# Patient Record
Sex: MALE | Race: WHITE | NOT HISPANIC OR LATINO | Employment: UNEMPLOYED | URBAN - METROPOLITAN AREA
[De-identification: names, ages, dates, MRNs, and addresses within clinical notes are randomized per-mention and may not be internally consistent; named-entity substitution may affect disease eponyms.]

---

## 2024-11-29 ENCOUNTER — OFFICE VISIT (OUTPATIENT)
Dept: URGENT CARE | Facility: CLINIC | Age: 34
End: 2024-11-29
Payer: MEDICAID

## 2024-11-29 VITALS
HEART RATE: 73 BPM | WEIGHT: 262.38 LBS | DIASTOLIC BLOOD PRESSURE: 78 MMHG | RESPIRATION RATE: 20 BRPM | TEMPERATURE: 98 F | HEIGHT: 66 IN | SYSTOLIC BLOOD PRESSURE: 125 MMHG | BODY MASS INDEX: 42.17 KG/M2 | OXYGEN SATURATION: 99 %

## 2024-11-29 DIAGNOSIS — S91.332A PUNCTURE WOUND OF LEFT FOOT, INITIAL ENCOUNTER: Primary | ICD-10-CM

## 2024-11-29 RX ORDER — CIPROFLOXACIN 500 MG/1
500 TABLET ORAL EVERY 12 HOURS
Qty: 10 TABLET | Refills: 0 | Status: SHIPPED | OUTPATIENT
Start: 2024-11-29 | End: 2024-12-04

## 2024-11-29 RX ORDER — SULFAMETHOXAZOLE AND TRIMETHOPRIM 800; 160 MG/1; MG/1
1 TABLET ORAL 2 TIMES DAILY
Qty: 10 TABLET | Refills: 0 | Status: SHIPPED | OUTPATIENT
Start: 2024-11-29 | End: 2024-12-04

## 2024-11-29 NOTE — PATIENT INSTRUCTIONS
You must understand that you have received treatment at an Urgent Care facility only, and that you may be  released before all of your medical problems are known or treated. Urgent Care facilities are not equipped to  handle life threatening emergencies. It is recommended that you seek care at an Emergency Department for  further evaluation of worsening or concerning symptoms, or possibly life threatening conditions as  discussed.    Clean area with Dial soap and water twice a day. Elevate foot. Take antibiotics with food. If you begin to have increase redness, discharge to area, fever, increase pain/swelling, or worsening symptoms then go to nearest Emergency Room.     Patient Instructions   1.  Take all medications as directed. If you have been prescribed antibiotics, make sure to complete them.   2.  Rest and keep yourself/patient well hydrated. For adults, it is recommended to drink at least 8-10 glasses of water daily.   3.  For patients above 6 months of age who are not allergic to and are not on anticoagulants, you can alternate Tylenol and Motrin every 4-6 hours for fever above 100.4F and/or pain.  For patients less than 6 months of age, allergic to or intolerant to NSAIDS, have gastritis, gastric ulcers, or history of GI bleeds, are pregnant, or are on anticoagulant therapy, you can take Tylenol every 4 hours as needed for fever above 100.4F and/or pain.   4. You should schedule a follow-up appointment with your Primary Care Provider/Pediatrician for recheck in 2-3 days or as directed at this visit.   5.  If your condition fails to improve in a timely manner, you should receive another evaluation by your Primary Care Provider/Pediatrician to discuss your concerns or return to urgent care for a recheck.  If your condition worsens at any time, you should report immediately to your nearest Emergency Department for further evaluation. **You must understand that you have received Urgent Care treatment only and  that you may be released before all of your medical problems are known or treated. You, the patient, are responsible to arrange for follow-up care as instructed.

## 2024-11-29 NOTE — PROGRESS NOTES
"Subjective:      Patient ID: Johnathon Villanueva is a 34 y.o. male.    Vitals:  height is 5' 6" (1.676 m) and weight is 119 kg (262 lb 5.6 oz). His oral temperature is 98 °F (36.7 °C). His blood pressure is 125/78 and his pulse is 73. His respiration is 20 and oxygen saturation is 99%.     Chief Complaint: Toe Injury    Patient states 2 days ago he stepped on a diaper pen in the yard. Stated it punctured the bottom of his foot about an inch. Pt reports pin went through his crocs. Denies fever, drainage from area. Reports soaking in Epsom salt. Pt reports removing entire pin.     Toe Injury  This is a new problem. Episode onset: 2 days ago. The problem occurs constantly. The problem has been gradually worsening. Associated symptoms include joint swelling. Pertinent negatives include no numbness. The symptoms are aggravated by walking and standing. Treatments tried: antibiotic ointment. The treatment provided no relief.       Musculoskeletal:  Positive for joint swelling.   Neurological:  Negative for numbness.      Objective:     Physical Exam   Constitutional:  Non-toxic appearance. He does not appear ill. No distress.   HENT:   Head: Normocephalic and atraumatic.   Ears:   Right Ear: External ear normal.   Left Ear: External ear normal.   Nose: Nose normal.   Mouth/Throat: Mucous membranes are moist.   Eyes: Conjunctivae are normal.   Neck: No neck rigidity present.   Pulmonary/Chest: Effort normal. No respiratory distress.   Abdominal: Normal appearance.   Musculoskeletal:        Feet:       Comments: Left foot has small puncture wound to medial plantar aspect of foot near 1st digit. No discharge, drainage, fluctulance to area. Area is TTP. No swelling noted. NVI. Limited AROM of first digit. No noted foreign body felt or seen.    Neurological: He is alert.   Skin: Skin is warm, dry and not diaphoretic. Capillary refill takes less than 2 seconds.   Psychiatric: His behavior is normal. Judgment and thought content " normal.   Nursing note and vitals reviewed.      Assessment:     1. Puncture wound of left foot, initial encounter        Plan:       Puncture wound of left foot, initial encounter  -     Tdap (BOOSTRIX) vaccine injection 0.5 mL  -     sulfamethoxazole-trimethoprim 800-160mg (BACTRIM DS) 800-160 mg Tab; Take 1 tablet by mouth 2 (two) times daily. for 5 days  Dispense: 10 tablet; Refill: 0  -     ciprofloxacin HCl (CIPRO) 500 MG tablet; Take 1 tablet (500 mg total) by mouth every 12 (twelve) hours. for 5 days  Dispense: 10 tablet; Refill: 0      Tdap UTD in clinic.   Pt declined x-ray imaging today to rule out foreign body. Reports he removed entire pin. No foreign body noted on examination.       No sign of infection noted on examination; however, since wound occurred through sole of shoe and involved dirt exposure will begin oral abx.     Advised to clean area with Dial soap and water. Rest and elevate.   Discussed with patient the importance of f/u with their primary care provider. Urged to go to the ER for any worsening signs or symptoms.     Patient Instructions   1.  Take all medications as directed. If you have been prescribed antibiotics, make sure to complete them.   2.  Rest and keep yourself/patient well hydrated. For adults, it is recommended to drink at least 8-10 glasses of water daily.   3.  For patients above 6 months of age who are not allergic to and are not on anticoagulants, you can alternate Tylenol and Motrin every 4-6 hours for fever above 100.4F and/or pain.  For patients less than 6 months of age, allergic to or intolerant to NSAIDS, have gastritis, gastric ulcers, or history of GI bleeds, are pregnant, or are on anticoagulant therapy, you can take Tylenol every 4 hours as needed for fever above 100.4F and/or pain.   4. You should schedule a follow-up appointment with your Primary Care Provider/Pediatrician for recheck in 2-3 days or as directed at this visit.   5.  If your condition fails to  improve in a timely manner, you should receive another evaluation by your Primary Care Provider/Pediatrician to discuss your concerns or return to urgent care for a recheck.  If your condition worsens at any time, you should report immediately to your nearest Emergency Department for further evaluation. **You must understand that you have received Urgent Care treatment only and that you may be released before all of your medical problems are known or treated. You, the patient, are responsible to arrange for follow-up care as instructed.

## 2025-03-06 ENCOUNTER — OFFICE VISIT (OUTPATIENT)
Dept: URGENT CARE | Facility: CLINIC | Age: 35
End: 2025-03-06
Payer: MEDICAID

## 2025-03-06 VITALS
HEART RATE: 103 BPM | SYSTOLIC BLOOD PRESSURE: 159 MMHG | HEIGHT: 66 IN | TEMPERATURE: 99 F | OXYGEN SATURATION: 97 % | DIASTOLIC BLOOD PRESSURE: 99 MMHG | BODY MASS INDEX: 42.71 KG/M2 | RESPIRATION RATE: 19 BRPM | WEIGHT: 265.75 LBS

## 2025-03-06 DIAGNOSIS — S61.411A LACERATION OF RIGHT HAND WITHOUT FOREIGN BODY, INITIAL ENCOUNTER: ICD-10-CM
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
## 2025-03-06 DIAGNOSIS — S61.512A LACERATION OF LEFT WRIST, INITIAL ENCOUNTER: ICD-10-CM

## 2025-03-06 RX ORDER — MUPIROCIN 20 MG/G
OINTMENT TOPICAL 3 TIMES DAILY
Qty: 22 G | Refills: 0 | Status: SHIPPED | OUTPATIENT
Start: 2025-03-06

## 2025-03-06 RX ORDER — AMOXICILLIN AND CLAVULANATE POTASSIUM 875; 125 MG/1; MG/1
1 TABLET, FILM COATED ORAL EVERY 12 HOURS
Qty: 20 TABLET | Refills: 0 | Status: SHIPPED | OUTPATIENT
Start: 2025-03-06 | End: 2025-03-06

## 2025-03-06 RX ORDER — MUPIROCIN 20 MG/G
OINTMENT TOPICAL 3 TIMES DAILY
Qty: 22 G | Refills: 0 | Status: SHIPPED | OUTPATIENT
Start: 2025-03-06 | End: 2025-03-06

## 2025-03-06 RX ORDER — HYDROCODONE BITARTRATE AND ACETAMINOPHEN 5; 325 MG/1; MG/1
1 TABLET ORAL EVERY 6 HOURS PRN
Qty: 20 TABLET | Refills: 0 | Status: SHIPPED | OUTPATIENT
Start: 2025-03-06 | End: 2025-03-11

## 2025-03-06 RX ORDER — AMOXICILLIN AND CLAVULANATE POTASSIUM 875; 125 MG/1; MG/1
1 TABLET, FILM COATED ORAL EVERY 12 HOURS
Qty: 20 TABLET | Refills: 0 | Status: SHIPPED | OUTPATIENT
Start: 2025-03-06 | End: 2025-03-16
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
## 2025-03-07 ENCOUNTER — TELEPHONE (OUTPATIENT)
Dept: URGENT CARE | Facility: CLINIC | Age: 35
End: 2025-03-07
Payer: MEDICAID

## 2025-03-07 NOTE — TELEPHONE ENCOUNTER
Called pharmacy to confirm they received the pain medication sent over.  Pharmacy stated they did receive it, but can't fill it until the patient brings in his ID.

## 2025-03-07 NOTE — PROCEDURES
"Laceration Repair    Date/Time: 3/6/2025 5:30 PM    Performed by: Pepper Motley PA-C  Authorized by: Pepper Motley PA-C  Consent Done: Yes  Consent: Verbal consent obtained  Risks and benefits: risks, benefits and alternatives were discussed  Consent given by: patient  Patient understanding: patient states understanding of the procedure being performed  Patient consent: the patient's understanding of the procedure matches consent given  Procedure consent: procedure consent matches procedure scheduled  Imaging studies: imaging studies available  Patient identity confirmed:  and name  Time out: Immediately prior to procedure a "time out" was called to verify the correct patient, procedure, equipment, support staff and site/side marked as required.  Body area: upper extremity  Location details: right hand  Laceration length: 4.5 (4.5 cm lac dorsum, 4 cm lac palmar, 2cm thenar) cm  Foreign bodies: no foreign bodies  Nerve involvement: none  Vascular damage: no  Anesthesia: local infiltration    Anesthesia:  Local Anesthetic: lidocaine 1% without epinephrine  Anesthetic total: 4.5 (1ml dorsum, 2.5ml palmar, 1ml thenar) mL    Patient sedated: no  Preparation: Patient was prepped and draped in the usual sterile fashion.  Irrigation solution: saline  Irrigation method: syringe  Amount of cleaning: extensive  Debridement: none  Degree of undermining: none  Skin closure: 5-0 Prolene (3 5-0 prolene dorsum, 3 5-0 prolene palmar, 1 5-0 thenar)  Number of sutures: 7  Technique: simple  Approximation: loose  Approximation difficulty: simple  Dressing: antibiotic ointment, dressing applied, non-stick sterile dressing and gauze roll  Patient tolerance: Patient tolerated the procedure well with no immediate complications        "

## 2025-03-07 NOTE — TELEPHONE ENCOUNTER
Called to f/u with pt. Started taking antibiotics last night. States he will call and schedule appointment with Ortho today.  [facility]  number provided if pt has trouble scheduling with his established Orthopedic

## 2025-03-07 NOTE — PATIENT INSTRUCTIONS
You must understand that you have received treatment at an Urgent Care facility only, and that you may be  released before all of your medical problems are known or treated. Urgent Care facilities are not equipped to  handle life threatening emergencies. It is recommended that you seek care at an Emergency Department for  further evaluation of worsening or concerning symptoms, or possibly life threatening conditions as  discussed.    Do not submerge hand. Keep area clean and dry. Take all antibiotics as prescribed. Follow up with Dr. Noble Palomo MD at [facility] in 1-2 days. A referral has been placed for you.     If you were given Narcotic medication, do not drive or operate machinery.   WOUND CARE/ABRASIONS    Wash the wound(s) gently daily with warm water and mild soap.      Next, apply Bactroban/mupirocin (if prescribed) or Vaseline/petroleum jelly, or Aquaphor.  Avoid Neosporin or bacitracin, as this can cause allergic reactions, making the wound itchy, red, and bumpy.      After application of ointment, bandage the wound using a topical non-stick dressing. This can be purchased over the counter. The wound may have weeping of clear fluid (exudates) which seeps through the non-stick pad, so place a layer of dry, absorbent gauze over this to handle any exudates.    This type of dressing should be removed and re-applied twice daily initially, then reduced to once daily when the weeping of clear fluid decreases.    Do not let the wound get dry and crusted.  Keeping it moist with the Vaseline/petroleum will help it heal faster.    Watch for signs or symptoms of infection such as increasing pain at the site of your wound; redness spreading around the wound edges (although a thin, light rim of redness around the wound edges initially can be normal); white, thick or foul-smelling discharge from the wound base; or unexplained fevers.  If you suspect these symptoms or have other unexplained symptoms or concerns,  seek out consultation with a qualified health care professional immediately.    Once your wound is no longer oozing and raw, and shiny new pink healthy skin is visible, begin:  Gentle rubbing of the scar(s) with Vaseline/petroleum jelly to help the scar mature and flatten faster.  Silicone gel sheeting such as Curad Scar Therapy can be used if it is raised.  Leave this on for 24 hours per day (you can leave it off for an hour or so around bathing).  If you are not able to do this, then use it nightly.  Replace the pad with a new one when old one falls off.    As the wound heals, keep in mind:  In the first few weeks, you may not feel satisfied with the appearance of your wound.  Don't get too concerned.  You won't see the final appearance of your scar for 6 months to a year, and it is very likely to fade and improve with time.    In the meantime, strict sun protection is essential. The sun can permanently darken scars.  Wear sun protective clothing and sunscreen which is at least SPF 30, broad spectrum (including UVA and UVB) with either zinc or titanium or both as active ingredients.  A thick layer and frequent application when in the sun for prolonged periods of time is essential.  You are also better off avoiding peak sun hours altogether!    [email or web address]    Your Laceration Aftercare Instructions    A laceration, or cut, is an open wound through the skin. These can be due to many different causes and can come in many different forms. The depth, location, and cause of your wound, among multiple other factors (including your preference) plays a role in the treatment choices made today.  The main purposes of these treatments are to stop the bleeding, and to help the wound heal with reduced scarring.  Care at home depends on the type of wound and method used to close or treat the wound such as sutures, (stitches), staples, tape (steri-strips), or skin glue  (Dermabond).   Keep the wound clean and dry for the first 24 hours.  The wound has probably had a bandage applied (unless skin glue was used, or if the wound was in a hard-to-bandage area, like your hair).  You can remove the bandage after 12-24 hours at your convenience.      If your wound was sutured or stapled:  You can clean the area with a mild soap and water 2 times a day. Don't use hydrogen peroxide, iodine-based solutions, or alcohol, which can slow healing, and will probably be painful!  Cover the wound with a thin layer of Bactroban/mupirocin.  Avoid Neosporin or bacitracin, as this can cause allergic reactions, making the wound itchy, red, and bumpy.  You can continue to apply the antibiotic twice daily until the wound scars and dries out, or the sutures/staples are removed.    Sutures and staples should be removed within 5 days to 2 weeks, depending on where on your body they are located.  One exception is if absorbable sutures were used-these sometimes don't require a visit for removal-I'll advise you if this was the case.      Optimal time frame for suture removal    Face     5 days  Scalp     7 days  Chest and arms   8-10  Back and Legs   10-14  Hands/fingers or Feet  10-14 days  High tension areas (joints)  10-14 days  Palms or soles   14-21 days    Keep in mind, specific situations related to your wound as well as your other medical conditions play into the optimal removal time-this is just a guideline!      If your cut was left open:  Many cuts are often left open.  This can be for several reasons, but often is because  the risk of infection is too high to primarily close the wound (many reasons for this)  the scar that is expected without closure is cosmetically acceptable to you  This is fine, and in many cases, advisable.      After your stitches/staples/glue/tape come off:  It's very important to recognize that the most vulnerable time for a wound are the days right after the closure material  has been removed.  This is when a wound is most susceptible to dehiscence (opening up).  Thus, be careful to avoid activities that could put your wound under unnecessary and excessive tension forces.  Any wound that passes through the full thickness of the skin will cause a permanent scar.  This scar may be very prominent at first but tends to gradually improve over the course of about a year. Protect your healing wound from excessive sunlight, which can darken the final appearance of a scar.  If a year has passed, and you are still not satisfied with the appearance of the scar, you can contact a plastic surgeon to discuss scar revision.    When should you call for help?  Call your doctor immediately or seek medical care in an emergency department if:  Your wound is causing new pain, or the pain gets worse.  Some pain is normal with a wound, but do not ignore pain that is getting worse instead of better. You could have an infection.  The cut starts to bleed, and blood soaks through the bandage. Oozing small amounts of blood is normal.  The skin near the cut is cold or pale or changes color.  You have tingling, weakness, or numbness near the cut that we did not address during your visit.  You have trouble moving the area near the cut that we did not address during the visit.  You have increased pain, swelling, warmth, or redness around the cut, which could be a sign of infection.  There are red streaks leading from the cut, or there is pus draining from the cut.  You have a fever that you can't explain for another reason (like having a common cold).    What else do I need to know/do?  Being treated in an urgent care is only one step in your treatment. Even if you feel better, you still need to go to all suggested follow-up appointments and take medicines exactly as directed. This will help you recover and help prevent future problems.    Follow-up care is a key part of your treatment and safety.  Be sure to make and  go to all appointments, and call your doctor if things are not going as expected.  If you've been prescribed antibiotics, take them as directed.  Do not stop taking them just because you feel better. If you don't finish the course, you can breed resistant infections, which are harder to treat!    Take good care of your wound at home to help it heal quickly and reduce your chance of infection. While your wound is healing, avoid any activity that could cause your wound to reopen.  Use common sense when it comes to activities.  Also, avoid unnecessary bacteria exposure-for example, swimming in an ocean or hot tub, or wearing shoes or gloves over a wound for a long period of time (which promotes bacterial growth).

## 2025-03-07 NOTE — TELEPHONE ENCOUNTER
"Called patient this morning and confirmed name and . Stated the pharmacy was unable to fill his pain medication last night and stated they did not have the prescription. Reviewed chart, prescription says "receipt confirmed by pharmacy at 8:00PM" for [place] prescription. Advised patient to call pharmacy today and see if they have order. If pharmacy does not have prescription to return call to clinic. Also spoke with patient to call [facility] today to schedule appt. Discussed I also put an internal ortho referral if [facility] does not take his insurance.   "

## 2025-03-08 ENCOUNTER — OFFICE VISIT (OUTPATIENT)
Dept: URGENT CARE | Facility: CLINIC | Age: 35
End: 2025-03-08
Payer: MEDICAID

## 2025-03-08 ENCOUNTER — TELEPHONE (OUTPATIENT)
Dept: URGENT CARE | Facility: CLINIC | Age: 35
End: 2025-03-08

## 2025-03-08 VITALS
HEART RATE: 76 BPM | SYSTOLIC BLOOD PRESSURE: 120 MMHG | HEIGHT: 66 IN | RESPIRATION RATE: 19 BRPM | OXYGEN SATURATION: 95 % | BODY MASS INDEX: 42.59 KG/M2 | WEIGHT: 265 LBS | DIASTOLIC BLOOD PRESSURE: 84 MMHG | TEMPERATURE: 99 F

## 2025-03-08 DIAGNOSIS — Z51.89 VISIT FOR WOUND CHECK: Primary | ICD-10-CM

## 2025-03-08 PROCEDURE — 99499 UNLISTED E&M SERVICE: CPT | Mod: S$GLB,,,

## 2025-03-08 NOTE — TELEPHONE ENCOUNTER
Called patient and informed him that we called [facility]  number to get him scheduled with Ortho, but they are closed on the weekend. Discussed that we called [facility] and they would return call on Monday to check if they had any sooner appt availability. Discussed with patient Discussed with patient the importance of prompt ortho f/u in 1-2 days. GO to ER for further concerns or worsening symptoms.

## 2025-03-08 NOTE — PROGRESS NOTES
"Subjective:      Patient ID: Johnathon Villanueva is a 35 y.o. male.    Vitals:  height is 5' 6" (1.676 m) and weight is 120.2 kg (265 lb). His oral temperature is 98.5 °F (36.9 °C). His blood pressure is 120/84 and his pulse is 76. His respiration is 19 and oxygen saturation is 95%.     Chief Complaint: Wound Check    Pt here for a wound check from a laceration repair on 03/06/2025. Pt reports his orthopedics f/u is not until April. Denies fever. Reports slight oozing from palm of hand laceration. Pt reports taking antibiotics as prescribed and using Bactroban ointment. Pt is also changing dressing daily.     Wound Check  He was originally treated 2 to 3 days ago. Previous treatment included laceration repair. His temperature was unmeasured prior to arrival. There has been bloody discharge from the wound. The redness has not changed. The swelling has not changed. The pain has worsened. There is difficulty moving the extremity or digit due to pain.     ROS   Objective:     Physical Exam   Constitutional:  Non-toxic appearance. He does not appear ill. No distress.   HENT:   Head: Normocephalic and atraumatic.   Ears:   Right Ear: External ear normal.   Left Ear: External ear normal.   Nose: Nose normal.   Mouth/Throat: Mucous membranes are moist.   Eyes: Conjunctivae are normal.   Neck: No neck rigidity present.   Abdominal: Normal appearance.   Musculoskeletal:      Comments: Pt has difficulty with 4th/5th finger extension. There is full extension but has pain. Slight bloody serous drainage from palmar laceration. No thick purulent drainage, significant swelling/erythema, erythematous streaking from area.    Neurological: He is alert.   Skin: Skin is warm, dry and not diaphoretic. Capillary refill takes less than 2 seconds.   Psychiatric: His behavior is normal. Judgment and thought content normal.   Nursing note and vitals reviewed.                Assessment:     1. Visit for wound check        Plan:       Visit for " wound check      New non-stick dressing and gauze wrap applied. Advised rinsing with dial soap and water twice a day. Apply Bactroban to puncture wounds. Follow up with Orthopedics as scheduled. Priscila Carroll MA Called [facility] orthopedics to see if patient can get a sooner appointment. Person on call stated they will check on Monday to see if there is sooner availability. RTC on 3/16 for suture removal or sooner if needed. Go to ER immediately for any new or worsening symptoms, sign of infection including redness, thick/colorful discharge, fever, red streak marks from area, increase swelling.      Patient Instructions   1.  Take all medications as directed. If you have been prescribed antibiotics, make sure to complete them.   2.  Rest and keep yourself/patient well hydrated. For adults, it is recommended to drink at least 8-10 glasses of water daily.   3.  For patients above 6 months of age who are not allergic to and are not on anticoagulants, you can alternate Tylenol and Motrin every 4-6 hours for fever above 100.4F and/or pain.  For patients less than 6 months of age, allergic to or intolerant to NSAIDS, have gastritis, gastric ulcers, or history of GI bleeds, are pregnant, or are on anticoagulant therapy, you can take Tylenol every 4 hours as needed for fever above 100.4F and/or pain.   4. You should schedule a follow-up appointment with your Primary Care Provider/Pediatrician for recheck in 2-3 days or as directed at this visit.   5.  If your condition fails to improve in a timely manner, you should receive another evaluation by your Primary Care Provider/Pediatrician to discuss your concerns or return to urgent care for a recheck.  If your condition worsens at any time, you should report immediately to your nearest Emergency Department for further evaluation. **You must understand that you have received Urgent Care treatment only and that you may be released before all of your medical problems are known or  treated. You, the patient, are responsible to arrange for follow-up care as instructed.

## 2025-03-11 ENCOUNTER — TELEPHONE (OUTPATIENT)
Dept: URGENT CARE | Facility: CLINIC | Age: 35
End: 2025-03-11
Payer: MEDICAID

## 2025-03-11 NOTE — TELEPHONE ENCOUNTER
Called [facility] clinic  for ortho appt.  They stated next available is in July.  Relayed to RENATE Bhatti.

## 2025-03-18 ENCOUNTER — OFFICE VISIT (OUTPATIENT)
Dept: URGENT CARE | Facility: CLINIC | Age: 35
End: 2025-03-18
Payer: MEDICAID

## 2025-03-18 VITALS
WEIGHT: 265 LBS | HEIGHT: 66 IN | RESPIRATION RATE: 18 BRPM | TEMPERATURE: 98 F | SYSTOLIC BLOOD PRESSURE: 129 MMHG | DIASTOLIC BLOOD PRESSURE: 84 MMHG | BODY MASS INDEX: 42.59 KG/M2 | OXYGEN SATURATION: 98 % | HEART RATE: 60 BPM

## 2025-03-18 DIAGNOSIS — Z48.02 VISIT FOR SUTURE REMOVAL: Primary | ICD-10-CM

## 2025-03-18 PROCEDURE — 99024 POSTOP FOLLOW-UP VISIT: CPT | Mod: S$GLB,,,

## 2025-03-18 PROCEDURE — 99499 UNLISTED E&M SERVICE: CPT | Mod: S$GLB,,,

## 2025-03-18 NOTE — PROCEDURES
Suture Removal    Date/Time: 3/18/2025 8:15 AM  Location procedure was performed: [facility]    Performed by: Pepper Motley PA-C  Authorized by: Pepper Motley PA-C  Body area: upper extremity  Location details: right hand  Description of findings: well healed no sign of infection.   Wound Appearance: normal color, well healed, nontender and no drainage  Sutures Removed: 7  Staples Removed: 0  Post-removal: no dressing applied  Facility: sutures placed in this facility  Complications: No  Estimated blood loss (mL): 0  Specimens: No  Implants: No  Patient tolerance: Patient tolerated the procedure well with no immediate complications

## 2025-03-18 NOTE — PROGRESS NOTES
"Subjective:      Patient ID: Johnathon Villanueva is a 35 y.o. male.    Vitals:  height is 5' 6" (1.676 m) and weight is 120.2 kg (265 lb). His oral temperature is 98.1 °F (36.7 °C). His blood pressure is 129/84 and his pulse is 60. His respiration is 18 and oxygen saturation is 98%.     Chief Complaint: Suture / Staple Removal    Pt was here on 03/08/2025 for a laceration. Pt here for suture removal.    Suture / Staple Removal  The sutures were placed 7 to 10 days ago. He tried antibiotic ointment use since the wound repair. The treatment provided moderate relief. Maximum temperature: none. There has been no drainage from the wound. The redness has improved. The swelling has improved. The pain has improved.     ROS   Objective:     Physical Exam   Constitutional:  Non-toxic appearance. He does not appear ill. No distress.   HENT:   Ears:   Right Ear: External ear normal.   Left Ear: External ear normal.   Nose: Nose normal.   Mouth/Throat: Mucous membranes are moist.   Neck: No neck rigidity present.   Abdominal: Normal appearance.   Musculoskeletal: Normal range of motion.         General: No swelling. Normal range of motion.      Right hand: Comments: Laceration to right hand are well healed with dry scabs. No erythema, oozing/drainage, red streaking from area. 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 4th/5th digits have full extension. All other puncture wounds appear well healed with no signs of infections.       Left hand: Comments: Left medial wrist has healed/scabbed laceration. No sign of infection.         Hands:    Neurological: He is alert.   Skin: Skin is not diaphoretic.   Psychiatric: His behavior is normal. Judgment and thought content normal.   Nursing note and vitals reviewed.      Assessment:     1. Visit for suture removal        Plan:       Visit for suture removal  -     Suture Removal      Advised to f/u with PCP as needed.   Discussed with patient the importance of f/u with their primary care " provider. Urged to go to the ER for any worsening signs or symptoms.    Continue Bactroban application, keep area cleaned.